# Patient Record
Sex: MALE | Race: WHITE | Employment: FULL TIME | URBAN - METROPOLITAN AREA
[De-identification: names, ages, dates, MRNs, and addresses within clinical notes are randomized per-mention and may not be internally consistent; named-entity substitution may affect disease eponyms.]

---

## 2024-07-22 ENCOUNTER — HOSPITAL ENCOUNTER (EMERGENCY)
Facility: HOSPITAL | Age: 64
Discharge: HOME | End: 2024-07-22
Attending: STUDENT IN AN ORGANIZED HEALTH CARE EDUCATION/TRAINING PROGRAM

## 2024-07-22 VITALS
RESPIRATION RATE: 18 BRPM | HEIGHT: 72 IN | SYSTOLIC BLOOD PRESSURE: 156 MMHG | WEIGHT: 176.37 LBS | BODY MASS INDEX: 23.89 KG/M2 | OXYGEN SATURATION: 98 % | DIASTOLIC BLOOD PRESSURE: 86 MMHG | HEART RATE: 72 BPM | TEMPERATURE: 98.3 F

## 2024-07-22 DIAGNOSIS — Z71.1 FEARED CONDITION NOT DEMONSTRATED: Primary | ICD-10-CM

## 2024-07-22 PROCEDURE — 99282 EMERGENCY DEPT VISIT SF MDM: CPT

## 2024-07-22 NOTE — ED TRIAGE NOTES
Patient gives self Testosterone shots and says the needle is stuck in right butt cheek. Needle is about 2.5 cm. Says he doesn't see/feel the needle but after he injected it noticed the needle wasn't intact.

## 2024-07-22 NOTE — ED PROVIDER NOTES
[facility] EMERGENCY DEP  EMERGENCY DEPARTMENT ENCOUNTER      Pt Name: Apoorva Christiansen  MRN: [id number]  Birthdate 1960  Date of evaluation: 7/22/2024  Provider: Kwasi Woods PA-C    CHIEF COMPLAINT       Chief Complaint   Patient presents with    Foreign Body         HISTORY OF PRESENT ILLNESS   (Location/Symptom, Timing/Onset, Context/Setting, Quality, Duration, Modifying Factors, Severity)  Note limiting factors.   64-year-old male with no significant past medical history presents with complaint of possible foreign body.  Patient reports that he got his testosterone prescription filled at [facility].  He gave himself an injection in the right buttocks.  He then noticed that the needle had come off and was worried it might be stuck in him.  Denies any pain in the buttocks.            Review of External Medical Records:     Nursing Notes were reviewed.    REVIEW OF SYSTEMS    (2-9 systems for level 4, 10 or more for level 5)     Review of Systems    Except as noted above the remainder of the review of systems was reviewed and negative.       PAST MEDICAL HISTORY   No past medical history on file.      SURGICAL HISTORY     No past surgical history on file.      CURRENT MEDICATIONS       Previous Medications    No medications on file       ALLERGIES     Patient has no allergy information on record.    FAMILY HISTORY     No family history on file.       SOCIAL HISTORY       Social History     Socioeconomic History    Marital status:            PHYSICAL EXAM    (up to 7 for level 4, 8 or more for level 5)     ED Triage Vitals [07/22/24 1559]   BP Temp Temp Source Pulse Respirations SpO2 Height Weight - Scale   (!) 156/86 98.3 °F (36.8 °C) Oral 72 18 98 % 1.829 m (6') 80 kg (176 lb 5.9 oz)       Body mass index is 23.92 kg/m².    Physical Exam  Vitals and nursing note reviewed.   Constitutional:       General: He is not in acute distress.     Appearance: Normal appearance. He is not ill-appearing.   HENT: